# Patient Record
Sex: FEMALE | Race: WHITE | NOT HISPANIC OR LATINO | Employment: UNEMPLOYED | URBAN - NONMETROPOLITAN AREA
[De-identification: names, ages, dates, MRNs, and addresses within clinical notes are randomized per-mention and may not be internally consistent; named-entity substitution may affect disease eponyms.]

---

## 2020-04-27 ENCOUNTER — APPOINTMENT (OUTPATIENT)
Dept: GENERAL RADIOLOGY | Facility: HOSPITAL | Age: 25
End: 2020-04-27

## 2020-04-27 ENCOUNTER — HOSPITAL ENCOUNTER (EMERGENCY)
Facility: HOSPITAL | Age: 25
Discharge: HOME | End: 2020-04-27
Attending: STUDENT IN AN ORGANIZED HEALTH CARE EDUCATION/TRAINING PROGRAM | Admitting: STUDENT IN AN ORGANIZED HEALTH CARE EDUCATION/TRAINING PROGRAM

## 2020-04-27 VITALS
SYSTOLIC BLOOD PRESSURE: 120 MMHG | WEIGHT: 249 LBS | HEART RATE: 85 BPM | OXYGEN SATURATION: 98 % | TEMPERATURE: 98.1 F | RESPIRATION RATE: 18 BRPM | BODY MASS INDEX: 37.74 KG/M2 | DIASTOLIC BLOOD PRESSURE: 69 MMHG | HEIGHT: 68 IN

## 2020-04-27 DIAGNOSIS — S13.9XXA NECK SPRAIN, INITIAL ENCOUNTER: Primary | ICD-10-CM

## 2020-04-27 PROCEDURE — 72040 X-RAY EXAM NECK SPINE 2-3 VW: CPT

## 2020-04-27 PROCEDURE — 99283 EMERGENCY DEPT VISIT LOW MDM: CPT

## 2020-04-27 RX ORDER — CYCLOBENZAPRINE HCL 10 MG
10 TABLET ORAL 3 TIMES DAILY PRN
Qty: 12 TABLET | Refills: 0 | Status: SHIPPED | OUTPATIENT
Start: 2020-04-27 | End: 2021-04-27

## 2020-10-31 ENCOUNTER — HOSPITAL ENCOUNTER (EMERGENCY)
Facility: HOSPITAL | Age: 25
Discharge: HOME | End: 2020-10-31
Attending: EMERGENCY MEDICINE | Admitting: STUDENT IN AN ORGANIZED HEALTH CARE EDUCATION/TRAINING PROGRAM

## 2020-10-31 VITALS
SYSTOLIC BLOOD PRESSURE: 142 MMHG | HEIGHT: 68 IN | OXYGEN SATURATION: 99 % | BODY MASS INDEX: 36.53 KG/M2 | DIASTOLIC BLOOD PRESSURE: 90 MMHG | TEMPERATURE: 98.4 F | WEIGHT: 241 LBS | HEART RATE: 105 BPM | RESPIRATION RATE: 18 BRPM

## 2020-10-31 DIAGNOSIS — N94.6 DYSMENORRHEA: Primary | ICD-10-CM

## 2020-10-31 DIAGNOSIS — N92.1 MENORRHAGIA WITH IRREGULAR CYCLE: ICD-10-CM

## 2020-10-31 LAB
ALBUMIN SERPL-MCNC: 4.3 G/DL (ref 3.5–5.2)
ALBUMIN/GLOB SERPL: 1.9 G/DL
ALP SERPL-CCNC: 78 U/L (ref 39–117)
ALT SERPL W P-5'-P-CCNC: 42 U/L (ref 1–33)
ANION GAP SERPL CALCULATED.3IONS-SCNC: 11.5 MMOL/L (ref 5–15)
AST SERPL-CCNC: 24 U/L (ref 1–32)
B-HCG UR QL: NEGATIVE
BACTERIA UR QL AUTO: ABNORMAL /HPF
BASOPHILS # BLD AUTO: 0.05 10*3/MM3 (ref 0–0.2)
BASOPHILS NFR BLD AUTO: 0.4 % (ref 0–1.5)
BILIRUB SERPL-MCNC: 0.4 MG/DL (ref 0–1.2)
BILIRUB UR QL STRIP: NEGATIVE
BUN SERPL-MCNC: 13 MG/DL (ref 6–20)
BUN/CREAT SERPL: 16.5 (ref 7–25)
CALCIUM SPEC-SCNC: 9.6 MG/DL (ref 8.6–10.5)
CHLORIDE SERPL-SCNC: 103 MMOL/L (ref 98–107)
CLARITY UR: ABNORMAL
CO2 SERPL-SCNC: 22.5 MMOL/L (ref 22–29)
COLOR UR: ABNORMAL
CREAT SERPL-MCNC: 0.79 MG/DL (ref 0.57–1)
DEPRECATED RDW RBC AUTO: 45.2 FL (ref 37–54)
EOSINOPHIL # BLD AUTO: 0.07 10*3/MM3 (ref 0–0.4)
EOSINOPHIL NFR BLD AUTO: 0.6 % (ref 0.3–6.2)
ERYTHROCYTE [DISTWIDTH] IN BLOOD BY AUTOMATED COUNT: 14.2 % (ref 12.3–15.4)
GFR SERPL CREATININE-BSD FRML MDRD: 90 ML/MIN/1.73
GLOBULIN UR ELPH-MCNC: 2.3 GM/DL
GLUCOSE SERPL-MCNC: 88 MG/DL (ref 65–99)
GLUCOSE UR STRIP-MCNC: NEGATIVE MG/DL
HCT VFR BLD AUTO: 36.1 % (ref 34–46.6)
HGB BLD-MCNC: 12 G/DL (ref 12–15.9)
HGB UR QL STRIP.AUTO: ABNORMAL
HYALINE CASTS UR QL AUTO: ABNORMAL /LPF
IMM GRANULOCYTES # BLD AUTO: 0.05 10*3/MM3 (ref 0–0.05)
IMM GRANULOCYTES NFR BLD AUTO: 0.4 % (ref 0–0.5)
KETONES UR QL STRIP: ABNORMAL
LEUKOCYTE ESTERASE UR QL STRIP.AUTO: NEGATIVE
LYMPHOCYTES # BLD AUTO: 3.02 10*3/MM3 (ref 0.7–3.1)
LYMPHOCYTES NFR BLD AUTO: 26.2 % (ref 19.6–45.3)
MCH RBC QN AUTO: 29.2 PG (ref 26.6–33)
MCHC RBC AUTO-ENTMCNC: 33.2 G/DL (ref 31.5–35.7)
MCV RBC AUTO: 87.8 FL (ref 79–97)
MONOCYTES # BLD AUTO: 0.6 10*3/MM3 (ref 0.1–0.9)
MONOCYTES NFR BLD AUTO: 5.2 % (ref 5–12)
NEUTROPHILS NFR BLD AUTO: 67.2 % (ref 42.7–76)
NEUTROPHILS NFR BLD AUTO: 7.72 10*3/MM3 (ref 1.7–7)
NITRITE UR QL STRIP: NEGATIVE
NRBC BLD AUTO-RTO: 0 /100 WBC (ref 0–0.2)
PH UR STRIP.AUTO: <=5 [PH] (ref 5–8)
PLATELET # BLD AUTO: 316 10*3/MM3 (ref 140–450)
PMV BLD AUTO: 9.4 FL (ref 6–12)
POTASSIUM SERPL-SCNC: 4.1 MMOL/L (ref 3.5–5.2)
PROT SERPL-MCNC: 6.6 G/DL (ref 6–8.5)
PROT UR QL STRIP: ABNORMAL
RBC # BLD AUTO: 4.11 10*6/MM3 (ref 3.77–5.28)
RBC # UR: ABNORMAL /HPF
REF LAB TEST METHOD: ABNORMAL
SODIUM SERPL-SCNC: 137 MMOL/L (ref 136–145)
SP GR UR STRIP: >=1.03 (ref 1–1.03)
SQUAMOUS #/AREA URNS HPF: ABNORMAL /HPF
UROBILINOGEN UR QL STRIP: ABNORMAL
WBC # BLD AUTO: 11.51 10*3/MM3 (ref 3.4–10.8)
WBC UR QL AUTO: ABNORMAL /HPF

## 2020-10-31 PROCEDURE — 81025 URINE PREGNANCY TEST: CPT | Performed by: PHYSICIAN ASSISTANT

## 2020-10-31 PROCEDURE — 80053 COMPREHEN METABOLIC PANEL: CPT | Performed by: PHYSICIAN ASSISTANT

## 2020-10-31 PROCEDURE — 99283 EMERGENCY DEPT VISIT LOW MDM: CPT

## 2020-10-31 PROCEDURE — 81001 URINALYSIS AUTO W/SCOPE: CPT | Performed by: PHYSICIAN ASSISTANT

## 2020-10-31 PROCEDURE — 85025 COMPLETE CBC W/AUTO DIFF WBC: CPT | Performed by: PHYSICIAN ASSISTANT

## 2020-10-31 RX ORDER — ESTRADIOL 1 MG/1
1 TABLET ORAL DAILY
Qty: 30 TABLET | Refills: 0 | Status: SHIPPED | OUTPATIENT
Start: 2020-11-01 | End: 2020-12-01 | Stop reason: SDUPTHER

## 2020-10-31 RX ORDER — ESTRADIOL 0.5 MG/1
1 TABLET ORAL ONCE
Status: COMPLETED | OUTPATIENT
Start: 2020-10-31 | End: 2020-10-31

## 2020-10-31 RX ORDER — PHENTERMINE HYDROCHLORIDE 37.5 MG/1
37.5 CAPSULE ORAL EVERY MORNING
COMMUNITY
End: 2022-01-03

## 2020-10-31 RX ADMIN — ESTRADIOL 1 MG: 0.5 TABLET ORAL at 22:35

## 2020-11-01 NOTE — ED NOTES
Dr. Peña called per MELISSA Charles with answer. Call transferred.      Colin Bethea  10/31/20 7420

## 2020-11-01 NOTE — ED PROVIDER NOTES
Subjective   25-year-old female presents with vaginal bleeding.  She states she has had vaginal bleeding for over 2 months.  She states it is like at times and heavy at times.  She states currently she is having heavy bleeding, and she is having to wear adult diapers.  She states she has been on birth control and has not had a normal period for a year but now for the last 2 months she has had vaginal bleeding.  No abdominal pain no fever chills.  She does not see a gynecologist regularly she states.      History provided by:  Patient   used: No        Review of Systems   Genitourinary: Positive for vaginal bleeding.   All other systems reviewed and are negative.      Past Medical History:   Diagnosis Date   • Anxiety    • Depression        No Known Allergies    History reviewed. No pertinent surgical history.    History reviewed. No pertinent family history.    Social History     Socioeconomic History   • Marital status: Single     Spouse name: Not on file   • Number of children: Not on file   • Years of education: Not on file   • Highest education level: Not on file   Tobacco Use   • Smoking status: Current Every Day Smoker     Types: Electronic Cigarette   Substance and Sexual Activity   • Alcohol use: Never     Frequency: Never   • Drug use: Never   • Sexual activity: Defer           Objective   Physical Exam  Vitals signs and nursing note reviewed.   Constitutional:       Appearance: She is well-developed.   HENT:      Head: Normocephalic.   Neck:      Musculoskeletal: Normal range of motion and neck supple.   Cardiovascular:      Rate and Rhythm: Normal rate and regular rhythm.   Pulmonary:      Effort: Pulmonary effort is normal.      Breath sounds: Normal breath sounds.   Abdominal:      General: Bowel sounds are normal.      Palpations: Abdomen is soft.   Musculoskeletal: Normal range of motion.   Skin:     General: Skin is warm and dry.   Neurological:      Mental Status: She is alert  and oriented to person, place, and time.      Deep Tendon Reflexes: Reflexes are normal and symmetric.         Procedures           ED Course  ED Course as of Oct 31 2228   Sat Oct 31, 2020   2221 Discussed with Dr. Peña she recommended estradiol 1 mg daily for 30 days, and follow-up in the office    [CS]      ED Course User Index  [CS] Melvin Vogt Jr., PA-C                                           MDM  Number of Diagnoses or Management Options  Dysmenorrhea: new and requires workup  Menorrhagia with irregular cycle: new and requires workup     Amount and/or Complexity of Data Reviewed  Clinical lab tests: reviewed  Tests in the radiology section of CPT®: reviewed    Risk of Complications, Morbidity, and/or Mortality  Presenting problems: minimal  Diagnostic procedures: minimal  Management options: minimal    Patient Progress  Patient progress: stable      Final diagnoses:   Dysmenorrhea   Menorrhagia with irregular cycle            Melvin Vogt Jr., PA-C  10/31/20 2228

## 2020-12-01 ENCOUNTER — OFFICE VISIT (OUTPATIENT)
Dept: OBSTETRICS AND GYNECOLOGY | Facility: CLINIC | Age: 25
End: 2020-12-01

## 2020-12-01 VITALS
HEIGHT: 68 IN | DIASTOLIC BLOOD PRESSURE: 78 MMHG | BODY MASS INDEX: 35.46 KG/M2 | WEIGHT: 234 LBS | SYSTOLIC BLOOD PRESSURE: 136 MMHG

## 2020-12-01 DIAGNOSIS — N92.1 MENORRHAGIA WITH IRREGULAR CYCLE: Primary | ICD-10-CM

## 2020-12-01 DIAGNOSIS — N94.6 DYSMENORRHEA: ICD-10-CM

## 2020-12-01 PROCEDURE — 99204 OFFICE O/P NEW MOD 45 MIN: CPT | Performed by: OBSTETRICS & GYNECOLOGY

## 2020-12-01 RX ORDER — ONDANSETRON HYDROCHLORIDE 8 MG/1
8 TABLET, FILM COATED ORAL EVERY 8 HOURS PRN
Qty: 30 TABLET | Refills: 0 | Status: SHIPPED | OUTPATIENT
Start: 2020-12-01 | End: 2021-04-27

## 2020-12-01 RX ORDER — ETONOGESTREL 68 MG/1
1 IMPLANT SUBCUTANEOUS ONCE
COMMUNITY
End: 2021-08-20

## 2020-12-01 RX ORDER — ESTRADIOL 1 MG/1
1 TABLET ORAL DAILY
Qty: 30 TABLET | Refills: 0 | Status: SHIPPED | OUTPATIENT
Start: 2020-12-01 | End: 2021-04-27

## 2020-12-01 NOTE — PROGRESS NOTES
Subjective  Chief Complaint   Patient presents with   • Menorrhagia     Patient complains of heavy vaginal bleeding x 4 month.      Patient is 25 y.o.  here as a new patient for evaluation of menorrhagia.  Patient reports she had Nexplanon inserted in February of this year.  Patient had previously been on oral contraceptives but could not remember to take them consistently.  Patient reports initially she did not have any bleeding with Nexplanon.  Patient reports then she had the onset of bleeding 4 months ago.  Patient reports she has been bleeding continuously since that time.  Patient reports at times the bleeding may be light in nature.  Patient reports at other times the bleeding is extremely heavy passing large clots.  Patient also reports she will have cramping associated with the bleeding.  The patient reports having cultures for STI in February of this year.  Patient was seen in the emergency room on .  Patient was seen secondary to the bleeding.  She did have labs at that time.  Patient did not have a transvaginal ultrasound.  Patient was given estradiol for her bleeding.  Patient reports she did not take the medication.  She received 2 tablets in the emergency room but did not get her prescription filled.  Patient reports she has trouble with hormones.  She reports significant mood swings and weight gain with anything hormonal.  The patient also reports having significant nausea with oral contraceptives.    History  Past Medical History:   Diagnosis Date   • Anxiety    • Depression    • Menorrhagia    • Nexplanon insertion 2019     Current Outpatient Medications on File Prior to Visit   Medication Sig Dispense Refill   • cyclobenzaprine (FLEXERIL) 10 MG tablet Take 1 tablet by mouth 3 (Three) Times a Day As Needed for Muscle Spasms. 12 tablet 0   • Etonogestrel (Nexplanon) 68 MG implant subdermal implant Inject 1 each into the appropriate area of the skin as directed by provider 1 (One)  "Time.     • phentermine (Adipex-P) 37.5 MG capsule Take 37.5 mg by mouth Every Morning.       No current facility-administered medications on file prior to visit.      No Known Allergies  Past Surgical History:   Procedure Laterality Date   • NO PAST SURGERIES       Family History   Problem Relation Age of Onset   • Breast cancer Paternal Grandmother    • Breast cancer Maternal Grandmother      Social History     Socioeconomic History   • Marital status: Single     Spouse name: Not on file   • Number of children: Not on file   • Years of education: Not on file   • Highest education level: Not on file   Social Needs   • Financial resource strain: Patient refused   • Food insecurity     Worry: Patient refused     Inability: Patient refused   • Transportation needs     Medical: Patient refused     Non-medical: Patient refused   Tobacco Use   • Smoking status: Current Every Day Smoker     Types: Electronic Cigarette   • Smokeless tobacco: Never Used   Substance and Sexual Activity   • Alcohol use: Never     Frequency: Never   • Drug use: Never   • Sexual activity: Not Currently     Partners: Male     Birth control/protection: Implant   Lifestyle   • Physical activity     Days per week: Patient refused     Minutes per session: Patient refused   • Stress: Patient refused       Review of Systems  All systems were reviewed and negative except for:  Constitution:  positive for chills  ENT:  positive for ear ringing  Genitourinary: postivie for  abnormal menstrual bleeding  Integument: positive for  rash  Behavioral/Psych: positive for  depression and unstable mood  Endocrine: positive for  temperature intolerance  Objective  Vitals:    12/01/20 1534   BP: 136/78   Weight: 106 kg (234 lb)   Height: 172.7 cm (68\")     Physical Exam:  General Appearance: alert, appears stated age and cooperative  Head: normocephalic, without obvious abnormality and atraumatic  Eyes: lids and lashes normal, conjunctivae and sclerae normal, no " icterus, no pallor, corneas clear and PERRLA  Ears: ears appear intact with no abnormalities noted  Nose: nares normal, septum midline, mucosa normal and no drainage  Neck: suppple, trachea midline and no thyromegaly  Lungs: clear to auscultation, respirations regular, respirations even and respirations unlabored  Heart: regular rhythm and normal rate, normal S1, S2, no murmur, gallop, or rubs and no click  Breasts: Not performed.  Abdomen: normal bowel sounds, no masses, no hepatomegaly, no splenomegaly, soft non-tender, no guarding and no rebound tenderness  Pelvic: Not performed.  Extremities: moves extremities well, no edema, no cyanosis and no redness  Skin: no bleeding, bruising or rash and no lesions noted  Lymph Nodes: no palpable adenopathy  Neuro: CN II-X grossly intact; sensation intact  Psych: normal mood and affect, oriented to person, time and place, thought content organized and appropriate judgment  Lab Review   Labs as noted  Admission on 10/31/2020, Discharged on 10/31/2020   Component Date Value   • WBC 10/31/2020 11.51*   • RBC 10/31/2020 4.11    • Hemoglobin 10/31/2020 12.0    • Hematocrit 10/31/2020 36.1    • MCV 10/31/2020 87.8    • MCH 10/31/2020 29.2    • MCHC 10/31/2020 33.2    • RDW 10/31/2020 14.2    • RDW-SD 10/31/2020 45.2    • MPV 10/31/2020 9.4    • Platelets 10/31/2020 316    • Neutrophil % 10/31/2020 67.2    • Lymphocyte % 10/31/2020 26.2    • Monocyte % 10/31/2020 5.2    • Eosinophil % 10/31/2020 0.6    • Basophil % 10/31/2020 0.4    • Immature Grans % 10/31/2020 0.4    • Neutrophils, Absolute 10/31/2020 7.72*   • Lymphocytes, Absolute 10/31/2020 3.02    • Monocytes, Absolute 10/31/2020 0.60    • Eosinophils, Absolute 10/31/2020 0.07    • Basophils, Absolute 10/31/2020 0.05    • Immature Grans, Absolute 10/31/2020 0.05    • nRBC 10/31/2020 0.0    • Glucose 10/31/2020 88    • BUN 10/31/2020 13    • Creatinine 10/31/2020 0.79    • Sodium 10/31/2020 137    • Potassium 10/31/2020 4.1     • Chloride 10/31/2020 103    • CO2 10/31/2020 22.5    • Calcium 10/31/2020 9.6    • Total Protein 10/31/2020 6.6    • Albumin 10/31/2020 4.30    • ALT (SGPT) 10/31/2020 42*   • AST (SGOT) 10/31/2020 24    • Alkaline Phosphatase 10/31/2020 78    • Total Bilirubin 10/31/2020 0.4    • eGFR Non African Amer 10/31/2020 90    • Globulin 10/31/2020 2.3    • A/G Ratio 10/31/2020 1.9    • BUN/Creatinine Ratio 10/31/2020 16.5    • Anion Gap 10/31/2020 11.5    • HCG, Urine QL 10/31/2020 Negative    • Color, UA 10/31/2020 Red*   • Appearance, UA 10/31/2020 Cloudy*   • pH, UA 10/31/2020 <=5.0    • Specific Gravity, UA 10/31/2020 >=1.030    • Glucose, UA 10/31/2020 Negative    • Ketones, UA 10/31/2020 40 mg/dL (2+)*   • Bilirubin, UA 10/31/2020 Negative    • Blood, UA 10/31/2020 Large (3+)*   • Protein, UA 10/31/2020 30 mg/dL (1+)*   • Leuk Esterase, UA 10/31/2020 Negative    • Nitrite, UA 10/31/2020 Negative    • Urobilinogen, UA 10/31/2020 1.0 E.U./dL    • RBC, UA 10/31/2020 31-50*   • WBC, UA 10/31/2020 None Seen    • Bacteria, UA 10/31/2020 None Seen    • Squamous Epithelial Cell* 10/31/2020 None Seen    • Hyaline Casts, UA 10/31/2020 None Seen    • Methodology 10/31/2020 Manual Light Microscopy        Imaging   Pelvic ultrasound report  Pelvic ultrasound images independantly reviewed; see report as noted  US Non-ob Transvaginal  Emiliana Nick  : 1995  MRN: [id number]  Date: 2020    Reason for exam/History: Menorrhagia    The ultrasound images are reviewed.  The uterus is anteverted in position.    The uterus appears normal.  The endometrium measures 8.43 mms.  The   bilateral ovaries are normal in appearance.  There are multiple follicles   seen bilaterally.  There is normal vascular flow noted.  There is no free   fluid noted.    The exam limitations noted:  none    See ultrasound report for measurements and structures identified.    Janice Peña MD, RDCHI St. [facility]  OB GYN  Joe    Decision to Obtain Medical Records  No    Summary of Medical Records  No    Assessment/Plan  Problems Addressed this Visit     None      Visit Diagnoses     Menorrhagia with irregular cycle    -  Primary  The patient was informed that menstrual flow outside of normal volume, duration, regularity, or frequency is considered abnormal uterine bleeding.  The patient was informed that the normal duration of menstrual flow is usually 5 days and the normal cycle typically lasts between 21-35 days.  The patient was informed that heavy menstrual bleeding has been defined as blood loss greater than 80 mL and given that this is hard to quantify excessive blood loss is based on the patient's perception. The patient was informed that AUB most frequently occurs in women aged 19-39 years as a result of pregnancy, structural lesions such as polyps or fibroids, anovulatory cycles, use of hormonal contraception, and endometrial hyperplasia.  She was counseled that endometrial cancer is less common but may occur.  It is also recommended that she have a transvaginal ultrasound for further evaluation which was done today as noted.  If anatomic abnormalities are noted then then surgery may be indicated. An endometrial ablation may also be an option to control bleeding in women who have completed childbearing.  The various options were discussed regarding medical management of her bleeding to include the use of nonsteroidal antiinflammatory drugs, progestins, combination oral contraceptives, a levonorgestrel intrauterine device, or tranexamic acid. The patient is informed if there is no improvement in her symptoms with medical management then she will need additional evaluation to include additional laboratory assessment and endometrial sampling.  The patient desires to keep her Nexplanon device at this time.  Prescription is given for estradiol as noted.  Prescription is also given for Zofran for possible nausea.  Patient is  encouraged to take the medication as given.  She is to follow-up in 4 to 5 weeks as discussed.  Plan pending response to treatment.    Relevant Orders    US Non-ob Transvaginal (Completed)    Dysmenorrhea      Emiliana Nick was counseled regarding the various etiologies for dysmenorrhea.  The patient was informed that primary dysmenorrhea is painful menstruation in the absence of pathology.  The various options for dysmenorrhea were discussed to include nonsteroidal antiinflammatory drugs, hormonal suppression, or both.  The patient was informed secondary dysmenorrhea is a result of pelvic pathology and is more common in patients with severe dysmenorrhea at menarche or progressively worsening dysmenorrhea, abnormal uterine bleeding, mid-cycle or acyclic pain, infertility, family history of endometriosis, dyspareunia, or lack of response to empiric therapy.  Evaluation for secondary causes includes pelvic ultrasonography and possible laparoscopy.  The various treatment options for secondary dysmenorrhea depends upon the etiology as discussed.  Prescription is given for estradiol as noted.  Patient is instructed in the use of Motrin for her cramping.  Patient is to follow-up in 4 to 5 weeks.      Diagnoses       Codes Comments    Menorrhagia with irregular cycle    -  Primary ICD-10-CM: N92.1  ICD-9-CM: 626.2     Dysmenorrhea     ICD-10-CM: N94.6  ICD-9-CM: 625.3                Follow up as discussed/scheduled  Note: Speech recognition transcription software may have been used to dictate portions of this document.  An attempt at proofreading has been made though minor errors in transcription may still be present.  This note was electronically signed.  Janice Peña M.D.

## 2021-04-27 ENCOUNTER — TELEMEDICINE (OUTPATIENT)
Dept: PSYCHIATRY | Facility: CLINIC | Age: 26
End: 2021-04-27

## 2021-04-27 DIAGNOSIS — F39 UNSPECIFIED MOOD (AFFECTIVE) DISORDER (HCC): Primary | Chronic | ICD-10-CM

## 2021-04-27 DIAGNOSIS — F41.1 GENERALIZED ANXIETY DISORDER: Chronic | ICD-10-CM

## 2021-04-27 PROCEDURE — 90792 PSYCH DIAG EVAL W/MED SRVCS: CPT | Performed by: NURSE PRACTITIONER

## 2021-04-27 RX ORDER — LAMOTRIGINE 25 MG/1
TABLET ORAL
Qty: 42 TABLET | Refills: 0 | Status: SHIPPED | OUTPATIENT
Start: 2021-04-27 | End: 2021-05-20 | Stop reason: DRUGHIGH

## 2021-04-27 NOTE — TREATMENT PLAN
Multi-Disciplinary Problems (from Behavioral Health Treatment Plan)    Active Problems     Problem: Anxiety  Start Date: 04/27/21    Problem Details: The patient self-scales this problem as a 8 with 10 being the worst.        Goal Priority Start Date Expected End Date End Date    Patient will develop and implement behavioral and cognitive strategies to reduce anxiety and irrational fears. -- 04/27/21 -- --    Goal Details: Progress toward goal:  Not appropriate to rate progress toward goal since this is the initial treatment plan.        Goal Intervention Frequency Start Date End Date    Help patient explore past emotional issues in relation to present anxiety. Q Month 04/27/21 --    Intervention Details: Duration of treatment until until remission of symptoms.        Goal Intervention Frequency Start Date End Date    Help patient develop an awareness of their cognitive and physical responses to anxiety. Q Month 04/27/21 --    Intervention Details: Duration of treatment until until remission of symptoms.              Problem: Mood Instability  Start Date: 04/27/21    Problem Details: The patient self-scales this problem as a 10 with 10 being the worst.        Goal Priority Start Date Expected End Date End Date    Patient will achieve mood stability as evidenced by controlled behavior and a more deliberate thought process -- 04/27/21 -- --    Goal Details: Progress toward goal:  Not appropriate to rate progress toward goal since this is the initial treatment plan.        Goal Intervention Frequency Start Date End Date    Provide structure and focus to patient's thoughts and actions by establishing plans and routine. Q Month 04/27/21 --    Intervention Details: Duration of treatment until until remission of symptoms.        Goal Intervention Frequency Start Date End Date    Assist patient in setting responsible goals and limits in behavior. Q Month 04/27/21 --    Intervention Details: Duration of treatment until until  remission of symptoms.                           I have discussed and reviewed this treatment plan with the patient and/or guardian.  The patient has verbally agreed with this treatment plan (no signatures are obtained at today's visit as the patient is a telehealth patient and is unable to print and sign this document, therefore verbal agreement is obtained).  .
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
## 2021-05-02 ENCOUNTER — APPOINTMENT (OUTPATIENT)
Dept: GENERAL RADIOLOGY | Facility: HOSPITAL | Age: 26
End: 2021-05-02

## 2021-05-02 ENCOUNTER — HOSPITAL ENCOUNTER (EMERGENCY)
Facility: HOSPITAL | Age: 26
Discharge: HOME | End: 2021-05-02
Attending: STUDENT IN AN ORGANIZED HEALTH CARE EDUCATION/TRAINING PROGRAM | Admitting: STUDENT IN AN ORGANIZED HEALTH CARE EDUCATION/TRAINING PROGRAM

## 2021-05-02 VITALS
DIASTOLIC BLOOD PRESSURE: 87 MMHG | OXYGEN SATURATION: 98 % | RESPIRATION RATE: 18 BRPM | HEART RATE: 99 BPM | SYSTOLIC BLOOD PRESSURE: 139 MMHG | BODY MASS INDEX: 38.74 KG/M2 | WEIGHT: 255.6 LBS | TEMPERATURE: 98.2 F | HEIGHT: 68 IN

## 2021-05-02 DIAGNOSIS — M54.50 LUMBAR BACK PAIN: Primary | ICD-10-CM

## 2021-05-02 LAB
B-HCG UR QL: NEGATIVE
BILIRUB UR QL STRIP: NEGATIVE
CLARITY UR: CLEAR
COLOR UR: YELLOW
GLUCOSE UR STRIP-MCNC: NEGATIVE MG/DL
HGB UR QL STRIP.AUTO: NEGATIVE
KETONES UR QL STRIP: NEGATIVE
LEUKOCYTE ESTERASE UR QL STRIP.AUTO: NEGATIVE
NITRITE UR QL STRIP: NEGATIVE
PH UR STRIP.AUTO: 7 [PH] (ref 5–8)
PROT UR QL STRIP: NEGATIVE
SP GR UR STRIP: 1.01 (ref 1–1.03)
UROBILINOGEN UR QL STRIP: NORMAL

## 2021-05-02 PROCEDURE — 81025 URINE PREGNANCY TEST: CPT | Performed by: PHYSICIAN ASSISTANT

## 2021-05-02 PROCEDURE — 63710000001 DEXAMETHASONE PER 0.25 MG: Performed by: PHYSICIAN ASSISTANT

## 2021-05-02 PROCEDURE — 25010000002 KETOROLAC TROMETHAMINE PER 15 MG: Performed by: PHYSICIAN ASSISTANT

## 2021-05-02 PROCEDURE — 72100 X-RAY EXAM L-S SPINE 2/3 VWS: CPT

## 2021-05-02 PROCEDURE — 81003 URINALYSIS AUTO W/O SCOPE: CPT | Performed by: PHYSICIAN ASSISTANT

## 2021-05-02 PROCEDURE — 96372 THER/PROPH/DIAG INJ SC/IM: CPT

## 2021-05-02 PROCEDURE — 99283 EMERGENCY DEPT VISIT LOW MDM: CPT

## 2021-05-02 RX ORDER — PREDNISONE 50 MG/1
50 TABLET ORAL DAILY
Qty: 5 TABLET | Refills: 0 | Status: SHIPPED | OUTPATIENT
Start: 2021-05-02 | End: 2021-05-07

## 2021-05-02 RX ORDER — DEXAMETHASONE 4 MG/1
10 TABLET ORAL ONCE
Status: COMPLETED | OUTPATIENT
Start: 2021-05-02 | End: 2021-05-02

## 2021-05-02 RX ORDER — CYCLOBENZAPRINE HCL 5 MG
5 TABLET ORAL 3 TIMES DAILY PRN
Qty: 8 TABLET | Refills: 0 | Status: SHIPPED | OUTPATIENT
Start: 2021-05-02 | End: 2021-05-10

## 2021-05-02 RX ORDER — IBUPROFEN 800 MG/1
800 TABLET ORAL
Qty: 9 TABLET | Refills: 0 | Status: SHIPPED | OUTPATIENT
Start: 2021-05-02 | End: 2021-05-05

## 2021-05-02 RX ORDER — LIDOCAINE 50 MG/G
1 PATCH TOPICAL EVERY 24 HOURS
Qty: 6 PATCH | Refills: 0 | Status: SHIPPED | OUTPATIENT
Start: 2021-05-02 | End: 2021-05-08

## 2021-05-02 RX ORDER — LIDOCAINE 50 MG/G
1 PATCH TOPICAL
Status: DISCONTINUED | OUTPATIENT
Start: 2021-05-02 | End: 2021-05-02 | Stop reason: HOSPADM

## 2021-05-02 RX ORDER — KETOROLAC TROMETHAMINE 30 MG/ML
60 INJECTION, SOLUTION INTRAMUSCULAR; INTRAVENOUS ONCE
Status: COMPLETED | OUTPATIENT
Start: 2021-05-02 | End: 2021-05-02

## 2021-05-02 RX ORDER — CYCLOBENZAPRINE HCL 10 MG
10 TABLET ORAL ONCE
Status: COMPLETED | OUTPATIENT
Start: 2021-05-02 | End: 2021-05-02

## 2021-05-02 RX ADMIN — CYCLOBENZAPRINE HYDROCHLORIDE 10 MG: 10 TABLET, FILM COATED ORAL at 18:02

## 2021-05-02 RX ADMIN — DEXAMETHASONE 10 MG: 4 TABLET ORAL at 18:02

## 2021-05-02 RX ADMIN — LIDOCAINE 1 PATCH: 50 PATCH CUTANEOUS at 17:17

## 2021-05-02 RX ADMIN — KETOROLAC TROMETHAMINE 60 MG: 30 INJECTION, SOLUTION INTRAMUSCULAR at 18:02

## 2021-05-02 NOTE — DISCHARGE INSTRUCTIONS
You may have some muscle strain causing your pain.  You can take Flexeril as needed for muscle strain or spasms.  Take steroids and ibuprofen to help with inflammation.  Use lidocaine patches up with pain.  Use warm compresses and massage.  You can follow-up with primary care provider in the next few days to reevaluate symptoms and ensure they are improving.  If your symptoms persist or worsen, may need to follow-up with orthopedic spine specialist and may contact Dr. Ulloa's office for appointment.  Return to the ER for any change, worsening symptoms, or any additional concerns include not limited to severe worsening pain, inability to urinate, bowel incontinence, groin paresthesias, fever >100.4.

## 2021-05-02 NOTE — ED PROVIDER NOTES
Subjective   Patient is a 26-year-old female history of anxiety, depression, and menorrhagia presenting to the ER for evaluation of back pain.  Patient states that she is intermittently had sharp pains in her lower back from time to time.  She states today she bent over to pick her son up from his playpen and had a very sharp pain in her mid back, felt as if she could not stand up and that her back was locked up.  She states the pain seemed to radiate diffuse across her back, did have some numb and tingling down the right leg.  She denies any previous injury, trauma or surgery to her back.  She denies any fever, chills, chest pain, shortness of breath, dysuria, hematuria, abdominal pain, change in bowel movements, or any other symptoms.  She denies any history of IV drug use.  Denies any groin paresthesias, inability to urinate, bowel incontinence.          Review of Systems   Constitutional: Negative for chills and fever.   HENT: Negative.    Eyes: Negative.    Respiratory: Negative.    Cardiovascular: Negative.    Gastrointestinal: Negative.    Genitourinary: Negative.    Musculoskeletal: Positive for back pain and myalgias.   Skin: Negative.    Allergic/Immunologic: Negative for immunocompromised state.   Neurological: Negative.    Psychiatric/Behavioral: Negative.        Past Medical History:   Diagnosis Date   • Anxiety    • Depression    • Menorrhagia    • Nexplanon insertion 2019       No Known Allergies    Past Surgical History:   Procedure Laterality Date   •  SECTION     • NO PAST SURGERIES         Family History   Problem Relation Age of Onset   • Breast cancer Paternal Grandmother    • Breast cancer Maternal Grandmother    • Schizophrenia Mother    • Bipolar disorder Mother    • Anxiety disorder Mother    • Drug abuse Mother    • Drug abuse Father    • Schizophrenia Brother    • Bipolar disorder Brother    • Suicide Attempts Maternal Uncle    • Suicide Attempts Cousin        Social History  "    Socioeconomic History   • Marital status: Single     Spouse name: Not on file   • Number of children: Not on file   • Years of education: Not on file   • Highest education level: Not on file   Tobacco Use   • Smoking status: Former Smoker   • Smokeless tobacco: Never Used   Vaping Use   • Vaping Use: Every day   • Substances: Nicotine   Substance and Sexual Activity   • Alcohol use: Yes     Comment: Once a month   • Drug use: Not Currently     Types: Marijuana   • Sexual activity: Yes     Partners: Male     Birth control/protection: Implant           Objective   Physical Exam  Vitals and nursing note reviewed.     /87 (BP Location: Right arm, Patient Position: Sitting)   Pulse 99   Temp 98.2 °F (36.8 °C) (Oral)   Resp 18   Ht 172.7 cm (68\")   Wt 116 kg (255 lb 9.6 oz)   LMP 03/20/2021   SpO2 98%   BMI 38.86 kg/m²     GEN: No acute distress, sitting upright in stretcher.  Awake and alert.  Does not appear toxic.  Head: Normocephalic, atraumatic  Eyes: EOM intact  ENT: Mask in place per protocol  Cardiovascular: Regular rate and rhythm  Lungs: Clear to auscultation bilaterally without adventitious sounds  Abdomen: Soft, nontender, nondistended, no peritoneal signs, no focal guarding  Back: No midline cervical, thoracic tenderness.  Patient does have some midline lumbar tenderness but no obvious deformity.  No specific paraspinal muscle tenderness.  Extremities: No edema, normal appearance, strength 5 out of 5  Neuro: GCS 15  Psych: Mood and affect are appropriate    Procedures           ED Course  ED Course as of May 02 1913   Sun May 02, 2021   1732 HCG, Urine QL: Negative [LA]   1732 Color, UA: Yellow [LA]   1732 Appearance, UA: Clear [LA]   1732 pH, UA: 7.0 [LA]   1732 Specific Gravity, UA: 1.012 [LA]   1732 Glucose: Negative [LA]   1732 Ketones, UA: Negative [LA]   1732 Bilirubin, UA: Negative [LA]   1732 Blood, UA: Negative [LA]   1732 Protein, UA: Negative [LA]   1732 Leukocytes, UA: Negative " [LA]   1732 Nitrite, UA: Negative [LA]   1732 Urobilinogen, UA: 0.2 E.U./dL [LA]   1828 Reviewed xray with Dr. Snyder.  Denies any acute bony abnormality.  Will give patient medicine to help treat her symptoms, follow-up as needed, discussed return precautions.    [LA]   1832 Discussed findings with the patient.  She is resting more comfortably in the stretcher.  She is asking for sandwich and Sprite.    [LA]      ED Course User Index  [LA] Susanne Valencia PA-C                                           MDM  Number of Diagnoses or Management Options  Lumbar back pain  Diagnosis management comments: On arrival, patient is stable.  Differential could include spondylolisthesis, muscle strain or spasm, and other concerns.  Low concern for spinal fracture.  She has no red flag symptoms for cauda equina, spinal abscess or aortic dissection.  Will obtain UPT and UA, will obtain x-ray of the lumbar spine.  Will give lidocaine patch, if UPT is negative, will give steroids, anti-inflammatories and muscle relaxer.    X-ray reviewed with Dr. Snyder, did not see any acute bony abnormality.  Urine had no signs of infection, UPT was negative.  Will give medications to help treat what is likely a muscle strain.  Discussed follow-up with her primary care provider, possible need for orthopedic surgery if her symptoms do not improve.  Discuss strict return precautions.  She verbalized understanding and was in agreement with this plan of care.       Amount and/or Complexity of Data Reviewed  Clinical lab tests: reviewed and ordered  Tests in the radiology section of CPT®: ordered and reviewed  Discussion of test results with the performing providers: yes  Review and summarize past medical records: yes  Discuss the patient with other providers: yes    Risk of Complications, Morbidity, and/or Mortality  Presenting problems: low  Diagnostic procedures: low  Management options: low    Patient Progress  Patient progress: stable      Final  diagnoses:   Lumbar back pain       ED Disposition  ED Disposition     ED Disposition Condition Comment    Discharge Stable           Yudy Ortiz, APRN  [address]  [address]  [facility] [address] [phone]    Schedule an appointment as soon as possible for a visit       Herberth Ulloa MD  [address]  [facility] [address] [phone]    Schedule an appointment as soon as possible for a visit   As needed, If symptoms worsen         Medication List      New Prescriptions    cyclobenzaprine 5 MG tablet  Commonly known as: FLEXERIL  Take 1 tablet by mouth 3 (Three) Times a Day As Needed for Muscle Spasms for up to 8 days.     ibuprofen 800 MG tablet  Commonly known as: ADVIL,MOTRIN  Take 1 tablet by mouth 3 (Three) Times a Day With Meals for 3 days.     lidocaine 5 %  Commonly known as: LIDODERM  Place 1 patch on the skin as directed by provider Daily for 6 days. Remove & Discard patch within 12 hours or as directed by MD     predniSONE 50 MG tablet  Commonly known as: DELTASONE  Take 1 tablet by mouth Daily for 5 days.           Where to Get Your Medications      These medications were sent to [id number] - [place], [place] - [address] AT [facility] [address] - [phone] PH - [phone] FX  [address], [facility] [address]    Phone: [phone]   · cyclobenzaprine 5 MG tablet  · ibuprofen 800 MG tablet  · lidocaine 5 %  · predniSONE 50 MG tablet          Susanne Valencia PA-C  05/02/21 [id number]

## 2021-05-20 ENCOUNTER — PRIOR AUTHORIZATION (OUTPATIENT)
Dept: PSYCHIATRY | Facility: CLINIC | Age: 26
End: 2021-05-20

## 2021-05-20 ENCOUNTER — TELEMEDICINE (OUTPATIENT)
Dept: PSYCHIATRY | Facility: CLINIC | Age: 26
End: 2021-05-20

## 2021-05-20 DIAGNOSIS — F41.1 GENERALIZED ANXIETY DISORDER: Chronic | ICD-10-CM

## 2021-05-20 DIAGNOSIS — F39 UNSPECIFIED MOOD (AFFECTIVE) DISORDER (HCC): Primary | Chronic | ICD-10-CM

## 2021-05-20 PROCEDURE — 99214 OFFICE O/P EST MOD 30 MIN: CPT | Performed by: NURSE PRACTITIONER

## 2021-05-20 RX ORDER — ARIPIPRAZOLE 5 MG/1
5 TABLET ORAL NIGHTLY
Qty: 30 TABLET | Refills: 0 | Status: SHIPPED | OUTPATIENT
Start: 2021-05-20 | End: 2021-06-19

## 2021-05-20 RX ORDER — LAMOTRIGINE 25 MG/1
TABLET ORAL
Qty: 60 TABLET | Refills: 0 | Status: SHIPPED | OUTPATIENT
Start: 2021-05-20 | End: 2021-08-21

## 2021-05-20 NOTE — PROGRESS NOTES
"This provider is located at the [facility] (through [facility]), [address], [facility], [address] using a secure [employer] Video Visit through [employer]. Patient is being seen remotely via telehealth at their home address in [place], and stated they are in a secure environment for this session. The patient's condition being diagnosed/treated is appropriate for telemedicine. The provider identified herself as well as her credentials.   The patient, and/or patients guardian, consent to be seen remotely, and when consent is given they understand that the consent allows for patient identifiable information to be sent to a third party as needed.   They may refuse to be seen remotely at any time. The electronic data is encrypted and password protected, and the patient and/or guardian has been advised of the potential risks to privacy not withstanding such measures.    You have chosen to receive care through a telehealth visit.  Do you consent to use a video/audio connection for your medical care today? Yes        Subjective   Emiliana Nick is a 26 y.o. female who presents today for follow up    Chief Complaint:  Mood instability    Accompanied by: Pt was alone for duration of appointment    History of Present Illness:   Pt states that not much has changed since her last appointment, but she never increased to 50 mg of Lamictal. Moods have been \"a little everywhere\". Pt states she feels 100 different emotions throughout the day. Pt has been getting along with others fairly well. Sleep is stable. Pt hasn't been overeating as much. There have been a few times where she had to remind herself to eat. Pt has been feeling \"some\" depression. She will feel it multiple times a day, but doesn't feel it constantly. She reports \"horrible\" anxiety. Pt's therapist went over screenings with pt to help give a more definitive diagnosis, she should have the results next week. The patient denies any " new medical problems or changes in medications since last appointment with this facility. The patient reports compliance with current medication regimen. The patient denies any current side effects from her current medication regimen. The patient denies any abnormal muscle movements or tics. The patient rates their depression at an 8/10 on a 0-10 scale, with 10 being the worst. The patient rates their anxiety at a 9/10 on a 0-10 scale, with 10 being the worst. The patient would like to adjust their medications at this visit. The patient denies any suicidal or homicidal ideations, plans, or intent at today's encounter and is convincing. The patient denies any auditory hallucinations or visual hallucinations. The patient does not endorse any significant symptoms consistent with abigail or psychosis at today's encounter.        Prior Psychiatric Medications:  Zoloft - caused hot flashes and didn't like the way it made her feel        The following portions of the patient's history were reviewed and updated as appropriate: allergies, current medications, past family history, past medical history, past social history, past surgical history and problem list.          Past Medical History:  Past Medical History:   Diagnosis Date   • Anxiety    • Depression    • Menorrhagia    • Nexplanon insertion 08/2019       Social History:  Social History     Socioeconomic History   • Marital status: Single     Spouse name: Not on file   • Number of children: Not on file   • Years of education: Not on file   • Highest education level: Not on file   Tobacco Use   • Smoking status: Former Smoker   • Smokeless tobacco: Never Used   Vaping Use   • Vaping Use: Every day   • Substances: Nicotine   Substance and Sexual Activity   • Alcohol use: Yes     Comment: Once a month   • Drug use: Not Currently     Types: Marijuana   • Sexual activity: Yes     Partners: Male     Birth control/protection: Implant       Family History:  Family History    Problem Relation Age of Onset   • Breast cancer Paternal Grandmother    • Breast cancer Maternal Grandmother    • Schizophrenia Mother    • Bipolar disorder Mother    • Anxiety disorder Mother    • Drug abuse Mother    • Drug abuse Father    • Schizophrenia Brother    • Bipolar disorder Brother    • Suicide Attempts Maternal Uncle    • Suicide Attempts Cousin        Past Surgical History:  Past Surgical History:   Procedure Laterality Date   •  SECTION     • NO PAST SURGERIES         Problem List:  There is no problem list on file for this patient.      Allergy:   No Known Allergies     Current Medications:   Current Outpatient Medications   Medication Sig Dispense Refill   • ARIPiprazole (ABILIFY) 5 MG tablet Take 1 tablet by mouth Every Night for 30 days. 30 tablet 0   • Etonogestrel (Nexplanon) 68 MG implant subdermal implant Inject 1 each into the appropriate area of the skin as directed by provider 1 (One) Time.     • lamoTRIgine (LaMICtal) 25 MG tablet Take 2 tablets PO QHS 60 tablet 0   • phentermine (Adipex-P) 37.5 MG capsule Take 37.5 mg by mouth Every Morning.       No current facility-administered medications for this visit.       Review of Symptoms:    Review of Systems   Constitutional: Positive for activity change, appetite change and fatigue.   Psychiatric/Behavioral: Positive for agitation, behavioral problems, decreased concentration, dysphoric mood, depressed mood and stress. The patient is nervous/anxious.          Physical Exam:   Due to the remote nature of this encounter (virtual encounter), vitals were unable to be obtained.  Height stated at 68 inches.  Weight stated at 255 pounds.      Physical Exam  Neurological:      Mental Status: She is alert.   Psychiatric:         Attention and Perception: Perception normal. She is inattentive.         Mood and Affect: Mood and affect normal.         Behavior: Behavior is cooperative.         Thought Content: Thought content is not paranoid  or delusional. Thought content does not include homicidal or suicidal ideation. Thought content does not include homicidal or suicidal plan.         Cognition and Memory: Cognition and memory normal.         Judgment: Judgment is impulsive.      Comments: Easily distracted. Restless  Normal mood and affect during appointment, but pt is reporting depressive symptoms.            Mental Status Exam:   Hygiene:   good  Cooperation:  Cooperative  Eye Contact:  Good  Psychomotor Behavior:  Restless  Affect:  Full range  Mood: normal  Speech:  Rapid  Thought Process:  Goal directed  Thought Content:  Normal  Suicidal:  None  Homicidal:  None  Hallucinations:  None  Delusion:  None  Memory:  Intact  Orientation:  Person, Place, Time and Situation  Reliability:  fair  Insight:  Fair  Judgement:  Fair  Impulse Control:  Fair  Physical/Medical Issues:  No        PHQ-9 Depression Screening  Little interest or pleasure in doing things? 3   Feeling down, depressed, or hopeless? 3   Trouble falling or staying asleep, or sleeping too much? 3   Feeling tired or having little energy? 3   Poor appetite or overeating? 3   Feeling bad about yourself - or that you are a failure or have let yourself or your family down? 3   Trouble concentrating on things, such as reading the newspaper or watching television? 1   Moving or speaking so slowly that other people could have noticed? Or the opposite - being so fidgety or restless that you have been moving around a lot more than usual? 1   Thoughts that you would be better off dead, or of hurting yourself in some way? 1   PHQ-9 Total Score 21   If you checked off any problems, how difficult have these problems made it for you to do your work, take care of things at home, or get along with other people? Extremely dIfficult     PHQ-9 Total Score: 21        ALEKSANDRA 7 anxiety screening tool that patient filled out virtually reviewed by this APRN at today's encounter.    PROMIS scale screening tool that  patient filled out virtually reviewed by this APRN at today's encounter.    Previous Provider notes and available records reviewed by this APRN at today's encounter.         Lab Results:   Admission on 05/02/2021, Discharged on 05/02/2021   Component Date Value Ref Range Status   • HCG, Urine QL 05/02/2021 Negative  Negative Final   • Color, UA 05/02/2021 Yellow  Yellow, Straw Final   • Appearance, UA 05/02/2021 Clear  Clear Final   • pH, UA 05/02/2021 7.0  5.0 - 8.0 Final   • Specific Gravity, UA 05/02/2021 1.012  1.005 - 1.030 Final   • Glucose, UA 05/02/2021 Negative  Negative Final   • Ketones, UA 05/02/2021 Negative  Negative Final   • Bilirubin, UA 05/02/2021 Negative  Negative Final   • Blood, UA 05/02/2021 Negative  Negative Final   • Protein, UA 05/02/2021 Negative  Negative Final   • Leuk Esterase, UA 05/02/2021 Negative  Negative Final   • Nitrite, UA 05/02/2021 Negative  Negative Final   • Urobilinogen, UA 05/02/2021 0.2 E.U./dL  0.2 - 1.0 E.U./dL Final         Assessment/Plan   Problems Addressed this Visit     None      Visit Diagnoses     Unspecified mood (affective) disorder (CMS/HCC)  (Chronic)   -  Primary    Relevant Medications    lamoTRIgine (LaMICtal) 25 MG tablet    ARIPiprazole (ABILIFY) 5 MG tablet    Generalized anxiety disorder  (Chronic)       Relevant Medications    lamoTRIgine (LaMICtal) 25 MG tablet    ARIPiprazole (ABILIFY) 5 MG tablet      Diagnoses       Codes Comments    Unspecified mood (affective) disorder (CMS/HCC)    -  Primary ICD-10-CM: F39  ICD-9-CM: 296.90     Generalized anxiety disorder     ICD-10-CM: F41.1  ICD-9-CM: 300.02           Visit Diagnoses:    ICD-10-CM ICD-9-CM   1. Unspecified mood (affective) disorder (CMS/[facility])  F39 296.90   2. Generalized anxiety disorder  F41.1 300.02   Rule out bipolar II disorder       GOALS:  Short Term Goals: Patient will be compliant with medication, and patient will have no significant medication related side effects.  Patient will  be engaged in psychotherapy as indicated.  Patient will report subjective improvement of symptoms.  Long term goals: To stabilize mood and treat/improve subjective symptoms, the patient will stay out of the hospital, the patient will be at an optimal level of functioning, and the patient will take all medications as prescribed.  The patient verbalized understanding and agreement with goals that were mutually set.      TREATMENT PLAN:   Continue supportive psychotherapy efforts and medications as indicated.   -Increase Lamictal to 50 mg PO daily for mood stabilization (pt has only been taking 25 mg Daily)  -Start Abilify 5 mg PO QHS for mood stabilization    Medication and treatment options, both pharmacological and non-pharmacological treatment options, discussed during today's visit, including any off label use of medication. Patient acknowledged and verbally consented with current treatment plan and was educated on the importance of compliance with treatment and follow-up appointments.      This APRN has discussed the benefits and risks of taking/continuing Lamictal (Lamotrigine).  The side effects of Lamictal can include a benign rash, blurred or double vision, dizziness, ataxia, sedation, headache, tremor, insomnia, poor coordination, fatigue,  nausea, vomiting, dyspepsia, rhinitis, infection, pharyngitis, asthenia, a rare but serious rash, rare multi-organ failure associated with Gonzalez-Tyler Syndrome, toxic epidermal necrolysis, drug hypersensitivity syndrome, rare blood dyscrasias, rare aseptic meningitis, rare sudden unexplained deaths in people with epilepsy, withdrawal seizures upon abrupt withdrawal, and rare activation of suicidal ideation and behavior (suicidality).  This APRN has discussed that a very slow dose titration when starting, or changing doses, of Lamictal may reduce the incidence of skin rash and other side effects.  The dosage should not be titrated upwards or increased faster than  recommended due to the possibility of the discussed side effects and risk of development of a skin rash (which can become life threatening).    This APRN has also discussed that if the patient stops taking the Lamictal for 3-5 days or longer, it will be necessary to restart the drug with an initial dose titration, as rashes have been reported on reexposure.  If the patient and Provider decide to stop the Lamictal, the patient will follow the directions of this APRN/this office as a guided taper over about two weeks is appropriate due to the risk of relapse in bipolar disorder with those with a mood or bipolar disorder, the risk of seizures in those with epilepsy, and discontinuation symptoms upon rapid discontinuation of Lamictal.    The patient verbalizes understanding of benefits and risks as discussed, the patient/guardian feels the benefits outweigh the risks and is agreeable to continue/take Lamictal as discussed.  The patient is advised should any side effects or rash develops they are to stop the Lamictal immediately and contact this APRN/this office or go to the emergency department immediately.  The patient verbalizes understanding and agreement with treatment plan in their own words.      MEDICATION ISSUES:    Discussed treatment plan and medication options of prescribed medication as well as the risks, benefits, any black box warnings, and side effects including potential falls, possible impaired driving, and metabolic adversities among others, including any off label use of medication. Patient is agreeable to call the office with any worsening of symptoms or onset of side effects, or if any concerns or questions arise.  The contact information for the office is made available to the patient. Patient is agreeable to call 911 or go to the nearest ER should they begin having any SI/HI, or if any urgent concerns arise. No medication side effects or related complaints today.       MEDS ORDERED DURING  VISIT:  New Medications Ordered This Visit   Medications   • lamoTRIgine (LaMICtal) 25 MG tablet     Sig: Take 2 tablets PO QHS     Dispense:  60 tablet     Refill:  0   • ARIPiprazole (ABILIFY) 5 MG tablet     Sig: Take 1 tablet by mouth Every Night for 30 days.     Dispense:  30 tablet     Refill:  0       Return in about 4 weeks (around 6/17/2021), or if symptoms worsen or fail to improve, for Recheck.     Treatment plan completed: 4/27/21    Progress toward goal: Not at goal    Functional Status: Moderate impairment     Prognosis: Good with Ongoing Treatment         This document has been electronically signed by NISH Goddard  May 20, 2021 10:50 EDT    Please note that portions of this note were completed with a voice recognition program. Efforts were made to edit dictation, but occasionally words are mistranscribed.

## 2021-05-27 ENCOUNTER — TELEPHONE (OUTPATIENT)
Dept: PSYCHIATRY | Facility: CLINIC | Age: 26
End: 2021-05-27

## 2021-05-27 NOTE — TELEPHONE ENCOUNTER
PATIENT CALLED AND SAID SHE WENT TO THE PHARMACY TO  HER NEW PRESCRIPTION OF ABILIFY AND IT WAS NOT THERE. SHE ONLY HAD A REFILL THERE FOR HER LAMICTAL. PATIENT WANTS TO KNOW IF YOU WILL CALL IN ABILIFY.

## 2021-05-27 NOTE — TELEPHONE ENCOUNTER
Cece is working on the PA for the medication. Please let pt know that I cannot make the process go any faster and I didn't fail to send it in. Thank you

## 2021-08-20 ENCOUNTER — OFFICE VISIT (OUTPATIENT)
Dept: OBSTETRICS AND GYNECOLOGY | Facility: CLINIC | Age: 26
End: 2021-08-20

## 2021-08-20 VITALS — WEIGHT: 245.4 LBS | HEIGHT: 68 IN | BODY MASS INDEX: 37.19 KG/M2

## 2021-08-20 DIAGNOSIS — Z30.8 ENCOUNTER FOR OTHER CONTRACEPTIVE MANAGEMENT: Primary | ICD-10-CM

## 2021-08-20 DIAGNOSIS — N92.6 IRREGULAR MENSES: ICD-10-CM

## 2021-08-20 DIAGNOSIS — Z30.46 ENCOUNTER FOR REMOVAL OF SUBDERMAL CONTRACEPTIVE IMPLANT: ICD-10-CM

## 2021-08-20 PROCEDURE — 11982 REMOVE DRUG IMPLANT DEVICE: CPT | Performed by: OBSTETRICS & GYNECOLOGY

## 2021-08-20 PROCEDURE — 99214 OFFICE O/P EST MOD 30 MIN: CPT | Performed by: OBSTETRICS & GYNECOLOGY

## 2021-08-20 RX ORDER — NORETHINDRONE ACETATE AND ETHINYL ESTRADIOL 1MG-20(21)
1 KIT ORAL DAILY
Qty: 28 TABLET | Refills: 12 | Status: SHIPPED | OUTPATIENT
Start: 2021-08-20 | End: 2022-01-03

## 2021-08-21 NOTE — PROGRESS NOTES
"Chief Complaint  Contraception (Nexplanon removal, wants oral contraceptives)     History of Present Illness:  Patient is 26 y.o.  who presents to [facility] for contraceptive evaluation and possible removal of her Nexplanon.  Patient has had her Nexplanon since February of last year.  The patient had previously been seen in December for heavy vaginal bleeding.  Patient reports she has continued to have irregular bleeding.  Patient is certain she desires removal of her Nexplanon today.  Patient would like to consider other options for contraception.  Patient has had problems in the past remembering to take oral contraceptives.  Patient thinks however she can remember to take them.  She is requesting a low-dose estrogen pill.  Patient has had problems with mood swings as well as nausea with oral contraceptives in the past.    Physical Examination:  Vital Signs: Ht 172.7 cm (68\")   Wt 111 kg (245 lb 6.4 oz)   BMI 37.31 kg/m²     General Appearance: alert, appears stated age, and cooperative  Breasts: Not performed.  Abdomen: no masses, no hepatomegaly, no splenomegaly, soft non-tender, no guarding and no rebound tenderness  Pelvic: Not performed.    Data Review:  The following data was reviewed by: Janice Peña MD on 2021:     Labs:    Imaging:    Medical Records:  None    Nexplanon Removal     No LMP recorded.    Date of procedure:  2021    Risks and benefits discussed? yes  All questions answered? yes  Consents given by the patient  Written consent obtained? yes    Local anesthesia used:  yes - 5 cc's of  Meds; anesthesia local: 1% lidocaine    Procedure documentation:    The upper right arm (non-dominant) was marked at the intended site of removal.  Betadine was used to cleanse the skin.  Local anesthesia was injected.  A vertical incision was created at the distal tip of the implant.  The implant was removed intact without difficulty.    Steri-strips were then placed " across the site of insertion and the arm was wrapped.    She tolerated the procedure well.  There were no complications.  EBL was minimal.    Post procedure instructions: Remove the wrapping in 24 hours and the steri-strips in 5 days.  The patient has been advised to contact the office if she develops fever, redness, swelling, pain, or discharge occurs at the procedure site.          Assessment and Plan   Problem List Items Addressed This Visit     None      Visit Diagnoses     Encounter for other contraceptive management    -  Primary  Contraceptive counseling was provided.  The various options for contraception was discussed including natural family planning, withdrawal method, barrier methods, spermicides, oral contraception, transdermal patch, vaginal ring, injection, implant, and IUDs.  The risks, complications, failure rates, and benefits of each were discussed.  Prescription is given for low-dose oral contraceptives as noted.  Instructions and precautions have been given.    Relevant Medications    norethindrone-ethinyl estradiol FE (Junel FE 1/20) 1-20 MG-MCG per tablet    Encounter for removal of subdermal contraceptive implant      Patient desires removal of her Nexplanon device today.  Her device was removed without difficulty.  Instructions and precautions are given.    Irregular menses      Patient with irregular menses since her Nexplanon insertion.  Patient is insistent upon removal of her device today.  Patient desires a trial with low-dose oral contraceptives.  Prescriptions given as noted.  Instructions and precautions have been given.  Patient is to call if no improvement in her symptoms as discussed.  Patient will need further evaluation if she continues to have irregular menstrual cycles.    Relevant Medications    norethindrone-ethinyl estradiol FE (Junel FE 1/20) 1-20 MG-MCG per tablet          Follow Up/Instructions:  Follow up as noted.  Patient was given instructions and counseling  regarding her condition or for health maintenance advice. Please see specific information pulled into the AVS if appropriate.     Note: Speech recognition transcription software may have been used to dictate portions of this document.  An attempt at proofreading has been made though minor errors in transcription may still be present.    This note was electronically signed.  Janice Peña M.D.

## 2021-12-30 ENCOUNTER — LAB (OUTPATIENT)
Dept: LAB | Facility: HOSPITAL | Age: 26
End: 2021-12-30

## 2021-12-30 ENCOUNTER — OFFICE VISIT (OUTPATIENT)
Dept: PSYCHIATRY | Facility: CLINIC | Age: 26
End: 2021-12-30

## 2021-12-30 DIAGNOSIS — F12.20 CANNABIS USE DISORDER, SEVERE, DEPENDENCE (HCC): ICD-10-CM

## 2021-12-30 DIAGNOSIS — F14.21 COCAINE USE DISORDER, SEVERE, IN EARLY REMISSION: ICD-10-CM

## 2021-12-30 DIAGNOSIS — F12.20 CANNABIS USE DISORDER, SEVERE, DEPENDENCE: ICD-10-CM

## 2021-12-30 DIAGNOSIS — F15.20 METHAMPHETAMINE USE DISORDER, MODERATE: ICD-10-CM

## 2021-12-30 DIAGNOSIS — F15.20 METHAMPHETAMINE USE DISORDER, MODERATE (HCC): ICD-10-CM

## 2021-12-30 DIAGNOSIS — F14.21 COCAINE USE DISORDER, SEVERE, IN EARLY REMISSION (HCC): Primary | ICD-10-CM

## 2021-12-30 LAB
AMPHET+METHAMPHET UR QL: NEGATIVE
AMPHETAMINES UR QL: NEGATIVE
BARBITURATES UR QL SCN: NEGATIVE
BENZODIAZ UR QL SCN: NEGATIVE
BUPRENORPHINE SERPL-MCNC: NEGATIVE NG/ML
CANNABINOIDS SERPL QL: NEGATIVE
COCAINE UR QL: NEGATIVE
METHADONE UR QL SCN: NEGATIVE
OPIATES UR QL: NEGATIVE
OXYCODONE UR QL SCN: NEGATIVE
PCP UR QL SCN: NEGATIVE
PROPOXYPH UR QL: NEGATIVE
TRICYCLICS UR QL SCN: POSITIVE

## 2021-12-30 PROCEDURE — G0481 DRUG TEST DEF 8-14 CLASSES: HCPCS

## 2021-12-30 PROCEDURE — 80306 DRUG TEST PRSMV INSTRMNT: CPT
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
## 2022-01-03 ENCOUNTER — TELEPHONE (OUTPATIENT)
Dept: PSYCHIATRY | Facility: CLINIC | Age: 27
End: 2022-01-03

## 2022-01-05 ENCOUNTER — DOCUMENTATION (OUTPATIENT)
Dept: PSYCHIATRY | Facility: HOSPITAL | Age: 27
End: 2022-01-05

## 2022-01-05 NOTE — PROGRESS NOTES
On 1/3/2021, client attended CD-IOP group. At around 7:05 PM, client stated that she had to leave to  her mother. Clinician will count this class as an excused absence. Client did complete check in form. Client on check in form repoted that her anxiety was a 7, depression was a 3, and cravings was a 0 on a 0:10 scale. Client on check in form denied drug or alcohol use in the past 7 days and reported that it had been 67 days since last drug or alcohol use. Client on check in form denied suicidal or homicidal thoughts or plan or intent to hurt self or others currently or since last group meeting. Client reported on check in form that goal today for recovery is “to stay sober, and find a home group. Get a job”.     -Stuart Ramírez, CADEW, CSW

## 2022-01-06 ENCOUNTER — DOCUMENTATION (OUTPATIENT)
Dept: PSYCHIATRY | Facility: HOSPITAL | Age: 27
End: 2022-01-06

## 2022-01-06 NOTE — PROGRESS NOTES
Clinician this date (1/6/2021) called client to let her know that due to weather conditions, CD-IOP could be done via telehealth tonight. No answer, left voicemail to call back.     -Stuart Ramírez, CADEW, CSW

## 2022-01-12 ENCOUNTER — DOCUMENTATION (OUTPATIENT)
Dept: PSYCHIATRY | Facility: HOSPITAL | Age: 27
End: 2022-01-12

## 2022-01-12 NOTE — PROGRESS NOTES
Clinician this date (1/12/2022) called client at approximately 09:30 AM, [phone]. Clinician was calling to follow up regarding CD-IOP. Client answered and stated she is doing classes at [facility] and has decided that she is not going to do IOP. Client stated it was inconvenient with her schedule. Clinician asked about client being referred by DCBS, client stated it was not on her case plan. Clinician explained that client is welcome to return to [facility] in the future.     -Stuart Ramírez, CADEW, CSW

## 2022-01-16 LAB
LABORATORY COMMENT REPORT: NORMAL
TRICYCLICS UR QL CFM: NEGATIVE NG/ML

## 2022-01-17 ENCOUNTER — APPOINTMENT (OUTPATIENT)
Dept: PSYCHIATRY | Facility: HOSPITAL | Age: 27
End: 2022-01-17

## 2022-01-19 ENCOUNTER — DOCUMENTATION (OUTPATIENT)
Dept: PSYCHIATRY | Facility: HOSPITAL | Age: 27
End: 2022-01-19

## 2022-01-19 ENCOUNTER — APPOINTMENT (OUTPATIENT)
Dept: PSYCHIATRY | Facility: HOSPITAL | Age: 27
End: 2022-01-19

## 2022-01-19 NOTE — PROGRESS NOTES
Noncompliant IOP Discharge  (less than three days attended)    NAME: Emiliana Nick  DATE: 01/19/2022    Patient was screened for CD IOP on 12/30/2021 by TAL Kent, CSW.  Pt left early from class on 1/3/2022, and no other classes attended. Clinician called client on 1/12/2022 and client stated she wanted to discharge.      Did not complete full discharge summary due to pt attended no days and not having treatment plan initiated.  Pt remains eligible for CD IOP level of care should pt wait 30 days and wish to be re-screened.  Discharge completed on 1/19/2022.     Medical provider NISH Lees notified.     MAXIMUS Kent

## 2022-01-20 ENCOUNTER — DOCUMENTATION (OUTPATIENT)
Dept: PSYCHIATRY | Facility: HOSPITAL | Age: 27
End: 2022-01-20

## 2022-01-20 ENCOUNTER — APPOINTMENT (OUTPATIENT)
Dept: PSYCHIATRY | Facility: HOSPITAL | Age: 27
End: 2022-01-20

## 2022-01-20 NOTE — PROGRESS NOTES
Clinician this date received call back from [facility] worker, WENDY Fillmore (DANNY on file). Clinician called Mrs. Dai back at [phone] at approximately 01:58 PM. Clinician explained that client was discharged from program and stated she was going to be doing classes at [facility] and stated it was inconvenient with her schedule.     -Stuart Ramírez, CADEW, CSW

## 2022-01-24 ENCOUNTER — DOCUMENTATION (OUTPATIENT)
Dept: PSYCHIATRY | Facility: HOSPITAL | Age: 27
End: 2022-01-24

## 2022-01-24 ENCOUNTER — APPOINTMENT (OUTPATIENT)
Dept: PSYCHIATRY | Facility: HOSPITAL | Age: 27
End: 2022-01-24

## 2022-01-24 NOTE — PROGRESS NOTES
Clinician received email on 1/24/2022 that more information was needed for discharge and to call [phone]. Clinician this date (1/24/2022) called Blair JOSHUA at [phone] at 02:13 PM, no answer. Clinician left voicemail with authorization number and requested a call back.     -Stuart Ramírez, CADEW, CSW

## 2022-01-24 NOTE — PROGRESS NOTES
Clinician received email on 1/24/2022 that more information was needed for discharge and to call [phone]. Clinician this date (1/24/2022) called Blair JOSHUA at [phone] at 02:13 PM, no answer. Clinician left voicemail with authorization number and requested a call back.    Blair JOSHUA Called clinician back at 02:46 PM this date (1/24/2022). Clinician notified her that client had discharged from [facility]. Client did not complete any [facility] classes.     -Stuart Ramírez, CADEW, CSW

## 2022-01-26 ENCOUNTER — APPOINTMENT (OUTPATIENT)
Dept: PSYCHIATRY | Facility: HOSPITAL | Age: 27
End: 2022-01-26

## 2022-01-27 ENCOUNTER — APPOINTMENT (OUTPATIENT)
Dept: PSYCHIATRY | Facility: HOSPITAL | Age: 27
End: 2022-01-27

## 2022-01-31 ENCOUNTER — APPOINTMENT (OUTPATIENT)
Dept: PSYCHIATRY | Facility: HOSPITAL | Age: 27
End: 2022-01-31

## 2022-02-02 ENCOUNTER — APPOINTMENT (OUTPATIENT)
Dept: PSYCHIATRY | Facility: HOSPITAL | Age: 27
End: 2022-02-02

## 2022-02-03 ENCOUNTER — APPOINTMENT (OUTPATIENT)
Dept: PSYCHIATRY | Facility: HOSPITAL | Age: 27
End: 2022-02-03

## 2022-02-07 ENCOUNTER — APPOINTMENT (OUTPATIENT)
Dept: PSYCHIATRY | Facility: HOSPITAL | Age: 27
End: 2022-02-07

## 2022-02-09 ENCOUNTER — APPOINTMENT (OUTPATIENT)
Dept: PSYCHIATRY | Facility: HOSPITAL | Age: 27
End: 2022-02-09

## 2022-02-10 ENCOUNTER — APPOINTMENT (OUTPATIENT)
Dept: PSYCHIATRY | Facility: HOSPITAL | Age: 27
End: 2022-02-10

## 2022-02-10 DIAGNOSIS — F39 UNSPECIFIED MOOD (AFFECTIVE) DISORDER: ICD-10-CM

## 2022-02-10 RX ORDER — CARIPRAZINE 3 MG/1
CAPSULE, GELATIN COATED ORAL
Qty: 30 CAPSULE | Refills: 0 | Status: SHIPPED | OUTPATIENT
Start: 2022-02-10 | End: 2022-03-15 | Stop reason: SDUPTHER

## 2022-02-14 ENCOUNTER — APPOINTMENT (OUTPATIENT)
Dept: PSYCHIATRY | Facility: HOSPITAL | Age: 27
End: 2022-02-14

## 2022-02-16 ENCOUNTER — APPOINTMENT (OUTPATIENT)
Dept: PSYCHIATRY | Facility: HOSPITAL | Age: 27
End: 2022-02-16

## 2022-02-17 ENCOUNTER — APPOINTMENT (OUTPATIENT)
Dept: PSYCHIATRY | Facility: HOSPITAL | Age: 27
End: 2022-02-17

## 2022-02-21 ENCOUNTER — APPOINTMENT (OUTPATIENT)
Dept: PSYCHIATRY | Facility: HOSPITAL | Age: 27
End: 2022-02-21

## 2022-02-23 ENCOUNTER — APPOINTMENT (OUTPATIENT)
Dept: PSYCHIATRY | Facility: HOSPITAL | Age: 27
End: 2022-02-23

## 2022-02-24 ENCOUNTER — APPOINTMENT (OUTPATIENT)
Dept: PSYCHIATRY | Facility: HOSPITAL | Age: 27
End: 2022-02-24

## 2022-02-28 ENCOUNTER — APPOINTMENT (OUTPATIENT)
Dept: PSYCHIATRY | Facility: HOSPITAL | Age: 27
End: 2022-02-28

## 2022-03-15 DIAGNOSIS — F39 UNSPECIFIED MOOD (AFFECTIVE) DISORDER: ICD-10-CM

## 2022-03-15 DIAGNOSIS — F51.01 PRIMARY INSOMNIA: ICD-10-CM

## 2022-03-15 RX ORDER — QUETIAPINE FUMARATE 100 MG/1
100 TABLET, FILM COATED ORAL NIGHTLY
Qty: 30 TABLET | Refills: 0 | Status: SHIPPED | OUTPATIENT
Start: 2022-03-15

## 2022-03-15 NOTE — TELEPHONE ENCOUNTER
I will send one month refill. She needs to schedule a follow up. If she would still like ADHD assessment, she can reschedule with Sebastián.

## 2022-03-16 ENCOUNTER — HOSPITAL ENCOUNTER (EMERGENCY)
Facility: HOSPITAL | Age: 27
Discharge: LEFT BEFORE DISCHARGE | End: 2022-03-16
Attending: EMERGENCY MEDICINE

## 2022-03-16 VITALS
HEIGHT: 68 IN | WEIGHT: 225 LBS | BODY MASS INDEX: 34.1 KG/M2 | HEART RATE: 111 BPM | OXYGEN SATURATION: 97 % | DIASTOLIC BLOOD PRESSURE: 84 MMHG | RESPIRATION RATE: 18 BRPM | TEMPERATURE: 98.2 F | SYSTOLIC BLOOD PRESSURE: 120 MMHG

## 2022-03-16 PROCEDURE — 99211 OFF/OP EST MAY X REQ PHY/QHP: CPT | Performed by: EMERGENCY MEDICINE

## 2022-08-07 ENCOUNTER — HOSPITAL ENCOUNTER (EMERGENCY)
Facility: HOSPITAL | Age: 27
Discharge: HOME | End: 2022-08-07
Attending: EMERGENCY MEDICINE | Admitting: EMERGENCY MEDICINE

## 2022-08-07 VITALS
SYSTOLIC BLOOD PRESSURE: 144 MMHG | OXYGEN SATURATION: 97 % | RESPIRATION RATE: 20 BRPM | HEART RATE: 137 BPM | DIASTOLIC BLOOD PRESSURE: 97 MMHG | TEMPERATURE: 99 F

## 2022-08-07 DIAGNOSIS — F15.10 METHAMPHETAMINE USE: ICD-10-CM

## 2022-08-07 DIAGNOSIS — F41.9 ANXIETY: Primary | ICD-10-CM

## 2022-08-07 LAB
ALBUMIN SERPL-MCNC: 4.8 G/DL (ref 3.5–5.2)
ALBUMIN/GLOB SERPL: 1.8 G/DL
ALP SERPL-CCNC: 79 U/L (ref 39–117)
ALT SERPL W P-5'-P-CCNC: 51 U/L (ref 1–33)
AMPHET+METHAMPHET UR QL: POSITIVE
AMPHETAMINES UR QL: POSITIVE
ANION GAP SERPL CALCULATED.3IONS-SCNC: 12.7 MMOL/L (ref 5–15)
AST SERPL-CCNC: 34 U/L (ref 1–32)
BARBITURATES UR QL SCN: NEGATIVE
BASOPHILS # BLD AUTO: 0.06 10*3/MM3 (ref 0–0.2)
BASOPHILS NFR BLD AUTO: 0.5 % (ref 0–1.5)
BENZODIAZ UR QL SCN: NEGATIVE
BILIRUB SERPL-MCNC: 0.7 MG/DL (ref 0–1.2)
BUN SERPL-MCNC: 8 MG/DL (ref 6–20)
BUN/CREAT SERPL: 7.8 (ref 7–25)
BUPRENORPHINE SERPL-MCNC: NEGATIVE NG/ML
CALCIUM SPEC-SCNC: 9.6 MG/DL (ref 8.6–10.5)
CANNABINOIDS SERPL QL: POSITIVE
CHLORIDE SERPL-SCNC: 103 MMOL/L (ref 98–107)
CO2 SERPL-SCNC: 24.3 MMOL/L (ref 22–29)
COCAINE UR QL: NEGATIVE
CREAT SERPL-MCNC: 1.02 MG/DL (ref 0.57–1)
DEPRECATED RDW RBC AUTO: 38.5 FL (ref 37–54)
EGFRCR SERPLBLD CKD-EPI 2021: 78.5 ML/MIN/1.73
EOSINOPHIL # BLD AUTO: 0.04 10*3/MM3 (ref 0–0.4)
EOSINOPHIL NFR BLD AUTO: 0.3 % (ref 0.3–6.2)
ERYTHROCYTE [DISTWIDTH] IN BLOOD BY AUTOMATED COUNT: 12.8 % (ref 12.3–15.4)
GLOBULIN UR ELPH-MCNC: 2.6 GM/DL
GLUCOSE SERPL-MCNC: 113 MG/DL (ref 65–99)
HCT VFR BLD AUTO: 39.7 % (ref 34–46.6)
HGB BLD-MCNC: 13.9 G/DL (ref 12–15.9)
IMM GRANULOCYTES # BLD AUTO: 0.05 10*3/MM3 (ref 0–0.05)
IMM GRANULOCYTES NFR BLD AUTO: 0.4 % (ref 0–0.5)
LYMPHOCYTES # BLD AUTO: 2.87 10*3/MM3 (ref 0.7–3.1)
LYMPHOCYTES NFR BLD AUTO: 24.8 % (ref 19.6–45.3)
MCH RBC QN AUTO: 29.2 PG (ref 26.6–33)
MCHC RBC AUTO-ENTMCNC: 35 G/DL (ref 31.5–35.7)
MCV RBC AUTO: 83.4 FL (ref 79–97)
METHADONE UR QL SCN: NEGATIVE
MONOCYTES # BLD AUTO: 0.67 10*3/MM3 (ref 0.1–0.9)
MONOCYTES NFR BLD AUTO: 5.8 % (ref 5–12)
NEUTROPHILS NFR BLD AUTO: 68.2 % (ref 42.7–76)
NEUTROPHILS NFR BLD AUTO: 7.89 10*3/MM3 (ref 1.7–7)
NRBC BLD AUTO-RTO: 0 /100 WBC (ref 0–0.2)
OPIATES UR QL: NEGATIVE
OXYCODONE UR QL SCN: NEGATIVE
PCP UR QL SCN: NEGATIVE
PLATELET # BLD AUTO: 399 10*3/MM3 (ref 140–450)
PMV BLD AUTO: 9.1 FL (ref 6–12)
POTASSIUM SERPL-SCNC: 3.3 MMOL/L (ref 3.5–5.2)
PROPOXYPH UR QL: NEGATIVE
PROT SERPL-MCNC: 7.4 G/DL (ref 6–8.5)
RBC # BLD AUTO: 4.76 10*6/MM3 (ref 3.77–5.28)
SODIUM SERPL-SCNC: 140 MMOL/L (ref 136–145)
TRICYCLICS UR QL SCN: POSITIVE
WBC NRBC COR # BLD: 11.58 10*3/MM3 (ref 3.4–10.8)

## 2022-08-07 PROCEDURE — 80053 COMPREHEN METABOLIC PANEL: CPT

## 2022-08-07 PROCEDURE — 36415 COLL VENOUS BLD VENIPUNCTURE: CPT

## 2022-08-07 PROCEDURE — 85025 COMPLETE CBC W/AUTO DIFF WBC: CPT

## 2022-08-07 PROCEDURE — 93005 ELECTROCARDIOGRAM TRACING: CPT

## 2022-08-07 PROCEDURE — 99284 EMERGENCY DEPT VISIT MOD MDM: CPT

## 2022-08-07 PROCEDURE — 80306 DRUG TEST PRSMV INSTRMNT: CPT

## 2022-08-07 RX ORDER — ONDANSETRON 2 MG/ML
4 INJECTION INTRAMUSCULAR; INTRAVENOUS ONCE
Status: DISCONTINUED | OUTPATIENT
Start: 2022-08-07 | End: 2022-08-08 | Stop reason: HOSPADM

## 2022-08-07 RX ORDER — SODIUM CHLORIDE 0.9 % (FLUSH) 0.9 %
10 SYRINGE (ML) INJECTION AS NEEDED
Status: DISCONTINUED | OUTPATIENT
Start: 2022-08-07 | End: 2022-08-08 | Stop reason: HOSPADM

## 2022-08-07 RX ORDER — BUSPIRONE HYDROCHLORIDE 5 MG/1
7.5 TABLET ORAL ONCE
Status: COMPLETED | OUTPATIENT
Start: 2022-08-07 | End: 2022-08-07

## 2022-08-07 RX ADMIN — BUSPIRONE HYDROCHLORIDE 7.5 MG: 5 TABLET ORAL at 19:26

## 2022-08-07 NOTE — ED PROVIDER NOTES
Subjective   Patient is a 27-year-old female here today with anxiety and possible dehydration.  She states that she used meth approximately 2 days ago.  Reports that she completed a rehabilitation program and has been clean since March.  She used small amounts throughout the day and finally went to sleep, her boyfriend was the only person with her at that time.  She states that she woke up after sleeping a very long period of time and states that there were multiple people over at her place, including family members, that made her feel uncomfortable and overwhelmed.  She was feeling very anxious and wanted to get away from these people.  She decided to walk to get away.  EMS was notified and brought her to the hospital.  She states that she just feels anxious and paranoid, and is mad at herself for breaking her sobriety.  She is concerned that she may be dehydrated from not drinking enough liquids and being out in the heat for a long period of time.        Review of Systems   Constitutional: Negative for chills and fever.   Respiratory: Negative for cough and shortness of breath.    Cardiovascular: Negative for chest pain.   Gastrointestinal: Negative for abdominal pain, nausea and vomiting.   Neurological: Negative for headaches.   Psychiatric/Behavioral: Negative for hallucinations, self-injury and suicidal ideas. The patient is nervous/anxious and is hyperactive.    All other systems reviewed and are negative.      Past Medical History:   Diagnosis Date   • Anxiety    • Depression    • Menorrhagia    • Nexplanon insertion 2019       No Known Allergies    Past Surgical History:   Procedure Laterality Date   •  SECTION     • NO PAST SURGERIES     • WISDOM TOOTH EXTRACTION         Family History   Problem Relation Age of Onset   • Breast cancer Paternal Grandmother    • Breast cancer Maternal Grandmother    • Schizophrenia Mother    • Bipolar disorder Mother    • Anxiety disorder Mother    • Drug abuse  Mother    • Drug abuse Father    • Schizophrenia Brother    • Bipolar disorder Brother    • Suicide Attempts Maternal Uncle    • Suicide Attempts Cousin        Social History     Socioeconomic History   • Marital status: Single   Tobacco Use   • Smoking status: Current Some Day Smoker     Types: Cigarettes   • Smokeless tobacco: Never Used   Vaping Use   • Vaping Use: Every day   • Substances: Nicotine   • Devices: Disposable   Substance and Sexual Activity   • Alcohol use: Not Currently     Comment: Once a month   • Drug use: Yes     Frequency: 2.0 times per week     Types: Methamphetamines   • Sexual activity: Yes     Partners: Male     Birth control/protection: None           Objective    /97 (BP Location: Left arm, Patient Position: Sitting)   Pulse (!) 137   Temp 99 °F (37.2 °C) (Oral)   Resp 20   LMP 07/19/2022 (Exact Date)   SpO2 97%     Physical Exam  Vitals and nursing note reviewed.   Constitutional:       Appearance: Normal appearance. She is normal weight.   HENT:      Head: Normocephalic and atraumatic.   Cardiovascular:      Rate and Rhythm: Regular rhythm. Tachycardia present.      Pulses: Normal pulses.      Heart sounds: Normal heart sounds.   Pulmonary:      Effort: Pulmonary effort is normal.      Breath sounds: Normal breath sounds.   Abdominal:      General: Abdomen is flat. Bowel sounds are normal. There is no distension.      Palpations: Abdomen is soft.      Tenderness: There is no abdominal tenderness.   Musculoskeletal:      Right lower leg: No edema.      Left lower leg: No edema.   Skin:     General: Skin is warm and dry.      Capillary Refill: Capillary refill takes less than 2 seconds.   Neurological:      General: No focal deficit present.      Mental Status: She is alert and oriented to person, place, and time.   Psychiatric:         Mood and Affect: Mood normal.         Behavior: Behavior normal.         Procedures           ED Course  ED Course as of 08/07/22 2246   Sun  Aug 07, 2022   1946 WBC(!): 11.58 [TA]   1946 RBC: 4.76 [TA]   1948 Hemoglobin: 13.9 [TA]   1948 Hematocrit: 39.7 [TA]   1948 Platelets: 399 [TA]   1953 THC Screen, Urine(!): Positive [TA]   1953 Methamphetamine, Ur(!): Positive [TA]   1953 Amphetamine, Urine Qual(!): Positive [TA]   1953 Tricyclic Antidepressants Screen(!): Positive [TA]   2007 Glucose(!): 113 [TA]   2007 BUN: 8 [TA]   2007 Creatinine(!): 1.02 [TA]   2007 Sodium: 140 [TA]   2007 Potassium(!): 3.3 [TA]   2007 ALT (SGPT)(!): 51 [TA]   2007 AST (SGOT)(!): 34 [TA]   2007 eGFR: 78.5 [TA]   2015 EKG interpretation time is 1847 sinus tachycardia 120 bpm QRS durations 80 QT is 296 QTC is 372 nonspecific T wave changes are noted this is an abnormal EKG. [MH]      ED Course User Index  [MH] Pearl Teresa,   [TA] Chao Morin, NISH                                           MDM  Number of Diagnoses or Management Options  Anxiety  Methamphetamine use (HCC)  Diagnosis management comments: Patient is a 27-year-old female here today with tachycardia and paranoia.  She does not appear to be in acute distress, vital signs show tachycardia with a rate of 120 to 130 bpm.  She is intermittently tearful during the exam.  Discussed treatment options with patient, she is agreeable to IV, check basic labs, obtain urine specimen to check a UDS, and IV fluids.  She states that she takes buspirone at home to help with anxiety but has not had any today, will provide patient with a dose.    No acute abnormalities noted with labs, most are within the normal ranges.  She is still having tachycardia at around 120 bpm.  UDS is positive for THC, methamphetamines, amphetamines, and tricyclic depressants.  Informed patient of her results and noted that she was not attached to the IV fluids.  When asked if she needed to be hooked to her IV fluids or how her anxiety was doing with the BuSpar and if she needs any other medication, patient denied need for the fluids or any  other medications stating that she was afraid to take or except anything else.  I asked if she wanted to speak with somebody in behavioral health and she states that she has seen behavioral health in the past but did not need to speak with anybody tonight.    Patient asked if she could potentially go home if she was to find a ride.  I feel that the patient is safe for discharge.  She has a ride coming to pick her up.  Advised her to continue her regular medications as normal.  She can follow-up with her primary provider for reevaluation.  Return to the emergency department for any new or worsening symptoms.       Amount and/or Complexity of Data Reviewed  Clinical lab tests: reviewed and ordered  Tests in the medicine section of CPT®: reviewed and ordered  Discussion of test results with the performing providers: yes  Discuss the patient with other providers: yes    Patient Progress  Patient progress: stable      Final diagnoses:   Anxiety   Methamphetamine use (HCC)       ED Disposition  ED Disposition     ED Disposition   Discharge    Condition   Stable    Comment   --             Yudy Ortiz, NISH  [id number] [id number] [phone]    Schedule an appointment as soon as possible for a visit   As needed         Medication List      No changes were made to your prescriptions during this visit.          Chao Morin, NISH  08/07/22 6972

## 2022-08-08 NOTE — ED NOTES
Pt wondering what else we are doing, NISH Morin contacted, pt is ready for DC with ride. Pt on phone arranging a ride.

## 2022-08-08 NOTE — DISCHARGE INSTRUCTIONS
Continue your regular medications at home as normal.  Be sure to increase your fluid intake.  Follow-up with your primary provider for reevaluation.  If you need any assistance or have any new or worsening symptoms, please return to the emergency department for a further work-up.